# Patient Record
Sex: MALE | Employment: FULL TIME | URBAN - METROPOLITAN AREA
[De-identification: names, ages, dates, MRNs, and addresses within clinical notes are randomized per-mention and may not be internally consistent; named-entity substitution may affect disease eponyms.]

---

## 2019-01-11 ENCOUNTER — OFFICE VISIT (OUTPATIENT)
Dept: URGENT CARE | Facility: CLINIC | Age: 39
End: 2019-01-11

## 2019-01-11 VITALS
SYSTOLIC BLOOD PRESSURE: 168 MMHG | OXYGEN SATURATION: 99 % | RESPIRATION RATE: 18 BRPM | DIASTOLIC BLOOD PRESSURE: 107 MMHG | HEIGHT: 72 IN | HEART RATE: 107 BPM | TEMPERATURE: 98 F | WEIGHT: 230 LBS | BODY MASS INDEX: 31.15 KG/M2

## 2019-01-11 DIAGNOSIS — T65.91XA ALLERGIC REACTION TO CHEMICAL SUBSTANCE, ACCIDENTAL OR UNINTENTIONAL, INITIAL ENCOUNTER: Primary | ICD-10-CM

## 2019-01-11 PROCEDURE — 96372 THER/PROPH/DIAG INJ SC/IM: CPT | Mod: S$GLB,,,

## 2019-01-11 PROCEDURE — 96372 PR INJECTION,THERAP/PROPH/DIAG2ST, IM OR SUBCUT: ICD-10-PCS | Mod: S$GLB,,,

## 2019-01-11 PROCEDURE — 99204 PR OFFICE/OUTPT VISIT, NEW, LEVL IV, 45-59 MIN: ICD-10-PCS | Mod: 25,S$GLB,, | Performed by: NURSE PRACTITIONER

## 2019-01-11 PROCEDURE — 99204 OFFICE O/P NEW MOD 45 MIN: CPT | Mod: 25,S$GLB,, | Performed by: NURSE PRACTITIONER

## 2019-01-11 RX ORDER — ALPRAZOLAM 1 MG/1
1 TABLET ORAL 2 TIMES DAILY
COMMUNITY

## 2019-01-11 RX ORDER — DIPHENHYDRAMINE HYDROCHLORIDE 50 MG/ML
25 INJECTION INTRAMUSCULAR; INTRAVENOUS ONCE
Status: COMPLETED | OUTPATIENT
Start: 2019-01-11 | End: 2019-01-11

## 2019-01-11 RX ADMIN — DIPHENHYDRAMINE HYDROCHLORIDE 25 MG: 50 INJECTION INTRAMUSCULAR; INTRAVENOUS at 04:01

## 2019-01-11 NOTE — PROGRESS NOTES
Subjective:       Patient ID: Angelo Griggs is a 38 y.o. male.    Vitals:  height is 6' (1.829 m) and weight is 104.3 kg (230 lb). His temperature is 98.1 °F (36.7 °C). His blood pressure is 168/107 (abnormal) and his pulse is 107. His respiration is 18 and oxygen saturation is 99%.     Chief Complaint: Eye Problem    Pt c/o left eye pain and redness due to possible splinter. Pt states he was moving plywood at the time of incident; possible chemical exposure to wood preservative;      Eye Problem    The left eye is affected. This is a new problem. The current episode started today. The problem occurs rarely. The problem has been unchanged. The injury mechanism was a foreign body. The pain is at a severity of 8/10. The pain is severe. There is no known exposure to pink eye. He does not wear contacts. Associated symptoms include eye redness and a foreign body sensation. Pertinent negatives include no blurred vision, eye discharge, double vision, fever, nausea, photophobia or vomiting. He has tried commercial eye wash for the symptoms. The treatment provided no relief.       Constitution: Negative for chills and fever.   HENT: Negative for congestion and sinus pain.    Eyes: Positive for foreign body in eye, eye pain and eye redness. Negative for eye trauma, eye discharge, photophobia, vision loss, double vision, blurred vision and eyelid swelling.   Gastrointestinal: Negative for nausea and vomiting.   Skin: Negative for rash.   Allergic/Immunologic: Negative for seasonal allergies and itching.   Neurological: Negative for headaches.       Objective:      Physical Exam   Constitutional: He appears well-developed and well-nourished.   HENT:   Head: Normocephalic.   Right Ear: External ear normal.   Left Ear: External ear normal.   Nose: Nose normal.   Mouth/Throat: Oropharynx is clear and moist.   Eyes: EOM are normal. Lids are everted and swept, no foreign bodies found. Right eye exhibits chemosis and discharge.  Right eye exhibits no hordeolum. No foreign body present in the right eye. Left eye exhibits chemosis and discharge. Left eye exhibits no hordeolum. No foreign body present in the left eye. Right conjunctiva is injected. Left conjunctiva is injected.           Assessment:       1. Allergic reaction to chemical substance, accidental or unintentional, initial encounter        Plan:       Advised patient to follow up with Ophthalmology; or ER;  Patient Instructions     Chemical Exposure: Eye     A chemical exposure, or injury, to the eye can occur when an acid or base solution comes in contact with your eye. As soon as the chemical gets in your eye, it is very important to flush (irrigate) your eye with sterile saline solution right away. If you don't have sterile saline, it is OK to use tap water instead. Chemical exposure can cause anything from mild irritation to permanent scarring and vision loss. How serious the injury is depends on what type of chemical it was, how concentrated it was, whether it was an acid or a base, and how long it was in your eye. After you flush the eye, it is important to follow up with a doctor if you have blurred vision or pain.  It is common to have some irritation for the next 24 hours, even in mild cases. If the exposure was more serious, be sure to follow up as directed.  The pressure inside of the eye can increase (glaucoma) hours to days after a chemical eye injury. This requires prompt treatment. Watch for the symptoms described below.  Home care  · You can put a cold pack (ice in a plastic bag, wrapped in a towel) over the eye for 20 minutes at a time. This will reduce pain.  · Eye drops may be prescribed to reduce irritation, inflammation, and risk of infection. If no drops were prescribed, you may use over-the-counter decongestant eye drops for irritation or redness.  · You may use acetaminophen or ibuprofen to control pain, unless another medicine was prescribed. If you have  chronic liver or kidney disease or have ever had a stomach ulcer or gastrointestinal bleeding, talk with your doctor before using these medicines.  · If an eye patch was put on:  ¨ You may put the cold pack over the eye patch.  ¨ If you were given a follow-up appointment to have the patch removed and the eye examined, don't miss it. An eye patch should not be left in place for more than 48 hours, unless you are advised to do so by your healthcare provider.  ¨ Do not drive a motor vehicle or use machinery with the eye patch in place. It is difficult to  distance with only one eye.  Follow-up care  Follow up with your healthcare provider, or as directed.  When to seek medical advice  Call your healthcare provider right away if any of these occur.  · Increased eyelid swelling  · Increasing pain in the eye  · Increasing redness or drainage from the eye  · Failure of normal vision to return within 24 to 48 hours  · Continued feeling that something is in your eye, lasting more than 48 hours  Date Last Reviewed: 6/14/2015  © 4593-5980 [employer]. [address] [place] [id number]. All rights reserved. This information is not intended as a substitute for professional medical care. Always follow your healthcare professional's instructions.            Allergic reaction to chemical substance, accidental or unintentional, initial encounter  -     diphenhydrAMINE injection 25 mg  -     methylPREDNISolone sod suc(PF) injection 125 mg  -     Ambulatory referral to Ophthalmology

## 2019-01-11 NOTE — PATIENT INSTRUCTIONS
Chemical Exposure: Eye     A chemical exposure, or injury, to the eye can occur when an acid or base solution comes in contact with your eye. As soon as the chemical gets in your eye, it is very important to flush (irrigate) your eye with sterile saline solution right away. If you don't have sterile saline, it is OK to use tap water instead. Chemical exposure can cause anything from mild irritation to permanent scarring and vision loss. How serious the injury is depends on what type of chemical it was, how concentrated it was, whether it was an acid or a base, and how long it was in your eye. After you flush the eye, it is important to follow up with a doctor if you have blurred vision or pain.  It is common to have some irritation for the next 24 hours, even in mild cases. If the exposure was more serious, be sure to follow up as directed.  The pressure inside of the eye can increase (glaucoma) hours to days after a chemical eye injury. This requires prompt treatment. Watch for the symptoms described below.  Home care  · You can put a cold pack (ice in a plastic bag, wrapped in a towel) over the eye for 20 minutes at a time. This will reduce pain.  · Eye drops may be prescribed to reduce irritation, inflammation, and risk of infection. If no drops were prescribed, you may use over-the-counter decongestant eye drops for irritation or redness.  · You may use acetaminophen or ibuprofen to control pain, unless another medicine was prescribed. If you have chronic liver or kidney disease or have ever had a stomach ulcer or gastrointestinal bleeding, talk with your doctor before using these medicines.  · If an eye patch was put on:  ¨ You may put the cold pack over the eye patch.  ¨ If you were given a follow-up appointment to have the patch removed and the eye examined, don't miss it. An eye patch should not be left in place for more than 48 hours, unless you are advised to do so by your healthcare provider.  ¨ Do not  drive a motor vehicle or use machinery with the eye patch in place. It is difficult to  distance with only one eye.  Follow-up care  Follow up with your healthcare provider, or as directed.  When to seek medical advice  Call your healthcare provider right away if any of these occur.  · Increased eyelid swelling  · Increasing pain in the eye  · Increasing redness or drainage from the eye  · Failure of normal vision to return within 24 to 48 hours  · Continued feeling that something is in your eye, lasting more than 48 hours  Date Last Reviewed: 6/14/2015  © 8754-9101 [employer]. [address] [place] [id number]. All rights reserved. This information is not intended as a substitute for professional medical care. Always follow your healthcare professional's instructions.

## 2021-05-21 ENCOUNTER — IMMUNIZATION (OUTPATIENT)
Dept: PRIMARY CARE CLINIC | Facility: CLINIC | Age: 41
End: 2021-05-21

## 2021-05-21 DIAGNOSIS — Z23 NEED FOR VACCINATION: Primary | ICD-10-CM

## 2021-05-21 PROCEDURE — 91303 COVID-19,VECTOR-NR,RS-AD26,PF,0.5 ML DOSE VACCINE (JANSSEN): ICD-10-PCS | Mod: S$GLB,,, | Performed by: INTERNAL MEDICINE

## 2021-05-21 PROCEDURE — 0031A COVID-19,VECTOR-NR,RS-AD26,PF,0.5 ML DOSE VACCINE (JANSSEN): ICD-10-PCS | Mod: CV19,S$GLB,, | Performed by: INTERNAL MEDICINE

## 2021-05-21 PROCEDURE — 0031A COVID-19,VECTOR-NR,RS-AD26,PF,0.5 ML DOSE VACCINE (JANSSEN): CPT | Mod: CV19,S$GLB,, | Performed by: INTERNAL MEDICINE

## 2021-05-21 PROCEDURE — 91303 COVID-19,VECTOR-NR,RS-AD26,PF,0.5 ML DOSE VACCINE (JANSSEN): CPT | Mod: S$GLB,,, | Performed by: INTERNAL MEDICINE

## 2022-10-02 ENCOUNTER — OFFICE VISIT (OUTPATIENT)
Dept: URGENT CARE | Facility: CLINIC | Age: 42
End: 2022-10-02

## 2022-10-02 VITALS
HEIGHT: 72 IN | RESPIRATION RATE: 20 BRPM | BODY MASS INDEX: 31.14 KG/M2 | HEART RATE: 123 BPM | TEMPERATURE: 98 F | SYSTOLIC BLOOD PRESSURE: 147 MMHG | OXYGEN SATURATION: 97 % | WEIGHT: 229.94 LBS | DIASTOLIC BLOOD PRESSURE: 98 MMHG

## 2022-10-02 DIAGNOSIS — S22.41XA CLOSED FRACTURE OF MULTIPLE RIBS OF RIGHT SIDE, INITIAL ENCOUNTER: ICD-10-CM

## 2022-10-02 DIAGNOSIS — R07.81 RIB PAIN: ICD-10-CM

## 2022-10-02 DIAGNOSIS — S42.401A CLOSED FRACTURE OF RIGHT ELBOW, INITIAL ENCOUNTER: Primary | ICD-10-CM

## 2022-10-02 DIAGNOSIS — M25.521 RIGHT ELBOW PAIN: ICD-10-CM

## 2022-10-02 PROCEDURE — 99204 OFFICE O/P NEW MOD 45 MIN: CPT | Mod: TIER,S$GLB,, | Performed by: NURSE PRACTITIONER

## 2022-10-02 PROCEDURE — 73080 X-RAY EXAM OF ELBOW: CPT | Mod: TIER,RT,S$GLB, | Performed by: RADIOLOGY

## 2022-10-02 PROCEDURE — 99204 PR OFFICE/OUTPT VISIT, NEW, LEVL IV, 45-59 MIN: ICD-10-PCS | Mod: TIER,S$GLB,, | Performed by: NURSE PRACTITIONER

## 2022-10-02 PROCEDURE — 71100 X-RAY EXAM RIBS UNI 2 VIEWS: CPT | Mod: TIER,RT,S$GLB, | Performed by: RADIOLOGY

## 2022-10-02 PROCEDURE — 71100 XR RIBS 2 VIEW RIGHT: ICD-10-PCS | Mod: TIER,RT,S$GLB, | Performed by: RADIOLOGY

## 2022-10-02 PROCEDURE — 73080 XR ELBOW COMPLETE 3 VIEW RIGHT: ICD-10-PCS | Mod: TIER,RT,S$GLB, | Performed by: RADIOLOGY

## 2022-10-02 RX ORDER — ACETAMINOPHEN 500 MG
1000 TABLET ORAL
Status: COMPLETED | OUTPATIENT
Start: 2022-10-02 | End: 2022-10-02

## 2022-10-02 RX ORDER — TRAMADOL HYDROCHLORIDE 50 MG/1
50 TABLET ORAL EVERY 6 HOURS
Qty: 20 TABLET | Refills: 0 | Status: SHIPPED | OUTPATIENT
Start: 2022-10-02 | End: 2022-10-07

## 2022-10-02 RX ORDER — TRAMADOL HYDROCHLORIDE 50 MG/1
50 TABLET ORAL EVERY 6 HOURS
Qty: 20 TABLET | Refills: 0 | Status: SHIPPED | OUTPATIENT
Start: 2022-10-02 | End: 2022-10-02 | Stop reason: SDUPTHER

## 2022-10-02 RX ADMIN — Medication 1000 MG: at 01:10

## 2022-10-02 NOTE — PROGRESS NOTES
Subjective:       Patient ID: Angelo Griggs is a 42 y.o. male.    Vitals:  height is 6' (1.829 m) and weight is 104.3 kg (229 lb 15 oz). His temperature is 98.4 °F (36.9 °C). His blood pressure is 147/98 (abnormal) and his pulse is 123 (abnormal). His respiration is 20 and oxygen saturation is 97%.     Chief Complaint: Arm Pain    Pt reports hurting right arm on Thursday while playing basketball. He went up for a lay up and someone put their shoulder in his right ribs. He reports falling with his arm under him. He reports that his right elbow to his fingers are hurting. He has bruising on his arm and his finger were numb. Pt cant extend his arm up or down without a stabbing pain    Provider note begins below:    Patient denies pain to the right lower forearm wrist or hand.  His right radial pulses present and strong.  The extremity is warm to the touch.  Capillary refill to distal phalanges of right arm less than 3 seconds.  He is able to adequately  with his right hand but feels as if the  strength is reduced and it is painful to .    He is also having point tenderness to his ribs and finds it difficult to take deep breaths.    Arm Pain   The incident occurred 3 to 5 days ago. The incident occurred at the gym. The injury mechanism was a fall. The pain is present in the right elbow, right fingers, right hand and right wrist. The quality of the pain is described as stabbing. The pain does not radiate. The pain is at a severity of 10/10. The pain is severe. The pain has been Constant since the incident. Associated symptoms include numbness. Pertinent negatives include no chest pain, muscle weakness or tingling. The symptoms are aggravated by movement and lifting. He has tried ice and NSAIDs for the symptoms. The treatment provided mild relief.   Cardiovascular:  Negative for chest pain.   Neurological:  Positive for numbness.     Objective:      Physical Exam   Constitutional: He is oriented to person,  place, and time. He appears well-developed. He is cooperative.  Non-toxic appearance. He does not appear ill. No distress.      Comments:Patient appears uncomfortable     HENT:   Head: Normocephalic and atraumatic.   Ears:   Right Ear: Hearing and external ear normal.   Left Ear: Hearing and external ear normal.   Nose: Nose normal.   Mouth/Throat: Mucous membranes are normal.   Eyes: Conjunctivae and lids are normal. No scleral icterus.   Neck: Trachea normal and phonation normal. Neck supple. No edema present. No erythema present. No neck rigidity present.   Cardiovascular: Normal rate, regular rhythm, normal heart sounds and normal pulses.   Pulses:       Radial pulses are 2+ on the right side.   Pulmonary/Chest: Effort normal and breath sounds normal. No stridor. No respiratory distress. He has no decreased breath sounds. He has no wheezes. He has no rhonchi. He has no rales.   Abdominal: Normal appearance.   Musculoskeletal:         General: No deformity.      Right elbow: He exhibits decreased range of motion and swelling. Tenderness found.   Neurological: He is alert and oriented to person, place, and time. He exhibits normal muscle tone. Coordination normal.   Skin: Skin is warm, dry, intact, not diaphoretic and not pale. bruising        Psychiatric: His speech is normal and behavior is normal. Judgment and thought content normal.   Nursing note and vitals reviewed.      X-Ray Ribs 2 View Right    Result Date: 10/2/2022  EXAMINATION: XR RIBS 2 VIEW RIGHT CLINICAL HISTORY: Pleurodynia TECHNIQUE: Two views of the right ribs were performed. COMPARISON: None. FINDINGS: Possible mildly displaced fractures of the right 7th and 8th ribs.  No additional acute fracture identified.  No subcutaneous emphysema in the chest wall.  No distinct pneumothorax.     Possible mildly displaced acute fractures of the right 7th and 8th ribs.  Suggest correlation with point tenderness. Electronically signed by: Mariano Stanley MD  Date:    10/02/2022 Time:    13:01    XR ELBOW COMPLETE 3 VIEW RIGHT    Result Date: 10/2/2022  EXAMINATION: XR ELBOW COMPLETE 3 VIEW RIGHT CLINICAL HISTORY: . Pain in right elbow TECHNIQUE: AP, lateral, and radial head views of the right elbow were performed. COMPARISON: None FINDINGS: There is an acute, intra-articular, nondisplaced fracture of the radial head and neck.  There is also age-indeterminate nondisplaced fracture of the coronoid process of the ulna.  Associated fat pad elevation in keeping with hemarthrosis.  Cartilage spaces are maintained.  Soft tissue swelling noted.     As above. Electronically signed by: Antonio Graham MD Date:    10/02/2022 Time:    12:58     Assessment:       1. Closed fracture of right elbow, initial encounter    2. Right elbow pain    3. Rib pain    4. Closed fracture of multiple ribs of right side, initial encounter          Plan:       Xrays ordered at this visit reviewed.     Closed fracture of right elbow, initial encounter  -     Discontinue: traMADoL (ULTRAM) 50 mg tablet; Take 1 tablet (50 mg total) by mouth every 6 (six) hours. for 5 days  Dispense: 20 tablet; Refill: 0  -     Ambulatory referral/consult to Orthopedics  -     traMADoL (ULTRAM) 50 mg tablet; Take 1 tablet (50 mg total) by mouth every 6 (six) hours. for 5 days  Dispense: 20 tablet; Refill: 0    Right elbow pain  -     XR ELBOW COMPLETE 3 VIEW RIGHT; Future; Expected date: 10/02/2022  -     SLING FOR HOME USE  -     acetaminophen tablet 1,000 mg    Rib pain  -     X-Ray Ribs 2 View Right; Future; Expected date: 10/02/2022  -     acetaminophen tablet 1,000 mg    Closed fracture of multiple ribs of right side, initial encounter  -     Discontinue: traMADoL (ULTRAM) 50 mg tablet; Take 1 tablet (50 mg total) by mouth every 6 (six) hours. for 5 days  Dispense: 20 tablet; Refill: 0  -     Ambulatory referral/consult to Orthopedics  -     traMADoL (ULTRAM) 50 mg tablet; Take 1 tablet (50 mg total) by mouth every  6 (six) hours. for 5 days  Dispense: 20 tablet; Refill: 0